# Patient Record
Sex: MALE | Race: WHITE
[De-identification: names, ages, dates, MRNs, and addresses within clinical notes are randomized per-mention and may not be internally consistent; named-entity substitution may affect disease eponyms.]

---

## 2018-09-19 ENCOUNTER — HOSPITAL ENCOUNTER (EMERGENCY)
Dept: HOSPITAL 80 - CED | Age: 28
Discharge: HOME | End: 2018-09-19
Payer: SELF-PAY

## 2018-09-19 VITALS — SYSTOLIC BLOOD PRESSURE: 118 MMHG | DIASTOLIC BLOOD PRESSURE: 75 MMHG
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
## 2018-09-19 DIAGNOSIS — S89.92XA: Primary | ICD-10-CM
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
## 2018-09-19 DIAGNOSIS — F17.200: ICD-10-CM
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
## 2018-09-19 PROCEDURE — L1830 KO IMMOB CANVAS LONG PRE OTS: HCPCS

## 2018-09-19 NOTE — EDPHY
H & P


Smoking Status: Heavy smoker





<MikaelaSukumar - Last Filed: 09/19/18 14:52>





<Hardeep Sutherland - Last Filed: 09/19/18 15:20>


Time Seen by Provider: 09/19/18 14:47


HPI/ROS: 





Chief complaint.  Knee injury





HPI.  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  He was caring a heavy rock and was twisting and felt 

that his left knee popped and gave out.  No evaluation.  He feels that he 

popped it back into place.  About 1 month ago his left knee gave out while 

standing at work.  He has had increased pain to the left knee over the past 2 

weeks.  Pain is on both sides of his knee and posteriorly.  Otherwise there has 

been no recent trauma.  He has not had any swelling.  He feels pressure to the 

back of his knee.  No previous knee surgery.





ROS


10 systems were reviewed and negative with the exception of the elements 

mentioned in the history of present illness


 (Sukumar Al)


Past Medical/Surgical History: 





Heart attack and stroke and a seizure when he was 21 (Sukumar Al)


Social History: 





, daily smoker, no recent alcohol (Sukumar Al)


Physical Exam: 





General Appearance:  Alert well-developed male mild distress vital signs are 

stable


Eyes: Pupils equal and round no pallor or injection.


ENT, Mouth:  Mucous membranes are moist.


Respiratory:  There are no retractions, lungs are clear to auscultation.


Cardiovascular: Regular rate and rhythm.


Gastrointestinal:   Abdomen is soft and nontender, no masses, bowel sounds 

normal.


Neurological:  Awake and alert, sensory and motor exams grossly normal.


Skin: Warm and dry, no rashes.


Musculoskeletal:  Neck is supple nontender.


Extremities left knee is normal appearance without any deformity or swelling.  

No tenderness over the patella.  Tenderness along the medial and lateral joint 

line as well as tenderness.  However no instability to stress


Psychiatric: Patient is oriented X 3, there is no agitation. (Sukumar Al)


Constitutional: 





 Initial Vital Signs











Temperature (C)  36.9 C   09/19/18 14:37


 


Heart Rate  86   09/19/18 14:37


 


Respiratory Rate  16   09/19/18 14:37


 


Blood Pressure  118/75   09/19/18 14:37


 


O2 Sat (%)  94   09/19/18 14:37








 











O2 Delivery Mode               Room Air














Allergies/Adverse Reactions: 


 





divalproex sodium [From Depakote] Allergy (Verified 09/19/18 14:44)


 


quetiapine [From Seroquel] Allergy (Verified 09/19/18 14:44)


 








Home Medications: 














 Medication  Instructions  Recorded


 


NK [No Known Home Meds]  09/19/18














Medical Decision Making





<Sukumar Al - Last Filed: 09/19/18 14:52>





- Diagnostics


Imaging: I viewed and interpreted images myself





<Hardeep Sutherland - Last Filed: 09/19/18 15:20>





- Diagnostics


Imaging Results: 





 Imaging Impressions





Knee X-Ray  09/19/18 14:56


Impression: No source for pain identified. The MRI may be complementary, as 

clinically directed.











Three-view knee x-rays:  Normal by my interpretation (Hardeep Sutherland)


ED Course/Re-evaluation: 





Discussion:  X-rays normal.  No ligamentous instability describes popping and 

pain in the joint space question meniscal injury.  I counseled regarding this.  

The patient declines a knee immobilizer and crutches but will except a neoprene 

brace.  I explained the need to follow up with orthopedic physician for further 

evaluation.  Understands this plan.  Will send him home on ibuprofen, Tylenol 

and tramadol. (Hardeep Sutherland)





Departure





<Sukumar Al - Last Filed: 09/19/18 14:52>





<Hardeep Sutherland - Last Filed: 09/19/18 15:20>





- Departure


Disposition: Home, Routine, Self-Care


Clinical Impression: 


Knee injury


Qualifiers:


 Encounter type: initial encounter Laterality: left Qualified Code(s): S89.92XA 

- Unspecified injury of left lower leg, initial encounter





Condition: Good


Instructions:  Meniscus Tear (ED)


Additional Instructions: 


Diagnosis:  Knee injury





Given the nature of her symptoms, it is likely have a meniscal injury.





Plan:  Ice





Ibuprofen and Tylenol for pain control as needed





Tramadol if needed in addition for pain that prevents sleep.  No driving, 

alcohol work on tramadol.





Call Dr. Shine-orthopedic physician to arrange follow-up appointment for further 

evaluation


Referrals: 


NONE *PRIMARY CARE P,. [Primary Care Provider] - As per Instructions


Yousuf Shine MD [Medical Doctor] - As per Instructions